# Patient Record
Sex: FEMALE | Race: WHITE | NOT HISPANIC OR LATINO
[De-identification: names, ages, dates, MRNs, and addresses within clinical notes are randomized per-mention and may not be internally consistent; named-entity substitution may affect disease eponyms.]

---

## 2017-09-11 ENCOUNTER — RX RENEWAL (OUTPATIENT)
Age: 57
End: 2017-09-11

## 2018-05-24 ENCOUNTER — EMERGENCY (EMERGENCY)
Facility: HOSPITAL | Age: 58
LOS: 1 days | Discharge: HOME | End: 2018-05-24
Attending: EMERGENCY MEDICINE
Payer: COMMERCIAL

## 2018-05-24 VITALS
RESPIRATION RATE: 17 BRPM | OXYGEN SATURATION: 99 % | SYSTOLIC BLOOD PRESSURE: 181 MMHG | DIASTOLIC BLOOD PRESSURE: 94 MMHG | HEART RATE: 84 BPM

## 2018-05-24 VITALS
OXYGEN SATURATION: 100 % | SYSTOLIC BLOOD PRESSURE: 166 MMHG | DIASTOLIC BLOOD PRESSURE: 91 MMHG | TEMPERATURE: 100 F | RESPIRATION RATE: 17 BRPM | HEART RATE: 69 BPM

## 2018-05-24 DIAGNOSIS — Z98.890 OTHER SPECIFIED POSTPROCEDURAL STATES: Chronic | ICD-10-CM

## 2018-05-24 LAB
ALBUMIN SERPL ELPH-MCNC: 4.8 G/DL — SIGNIFICANT CHANGE UP (ref 3.3–5)
ALP SERPL-CCNC: 74 U/L — SIGNIFICANT CHANGE UP (ref 40–120)
ALT FLD-CCNC: 21 U/L — SIGNIFICANT CHANGE UP (ref 10–45)
ANION GAP SERPL CALC-SCNC: 13 MMOL/L — SIGNIFICANT CHANGE UP (ref 5–17)
APTT BLD: 32.4 SEC — SIGNIFICANT CHANGE UP (ref 27.5–37.4)
AST SERPL-CCNC: 27 U/L — SIGNIFICANT CHANGE UP (ref 10–40)
BASOPHILS # BLD AUTO: 0 K/UL — SIGNIFICANT CHANGE UP (ref 0–0.2)
BASOPHILS NFR BLD AUTO: 0.4 % — SIGNIFICANT CHANGE UP (ref 0–2)
BILIRUB SERPL-MCNC: 0.4 MG/DL — SIGNIFICANT CHANGE UP (ref 0.2–1.2)
BUN SERPL-MCNC: 17 MG/DL — SIGNIFICANT CHANGE UP (ref 7–23)
CALCIUM SERPL-MCNC: 10.3 MG/DL — SIGNIFICANT CHANGE UP (ref 8.4–10.5)
CHLORIDE SERPL-SCNC: 100 MMOL/L — SIGNIFICANT CHANGE UP (ref 96–108)
CO2 SERPL-SCNC: 27 MMOL/L — SIGNIFICANT CHANGE UP (ref 22–31)
CREAT SERPL-MCNC: 0.99 MG/DL — SIGNIFICANT CHANGE UP (ref 0.5–1.3)
EOSINOPHIL # BLD AUTO: 0 K/UL — SIGNIFICANT CHANGE UP (ref 0–0.5)
EOSINOPHIL NFR BLD AUTO: 0.3 % — SIGNIFICANT CHANGE UP (ref 0–6)
GLUCOSE SERPL-MCNC: 113 MG/DL — HIGH (ref 70–99)
HCT VFR BLD CALC: 38.9 % — SIGNIFICANT CHANGE UP (ref 34.5–45)
HGB BLD-MCNC: 12.8 G/DL — SIGNIFICANT CHANGE UP (ref 11.5–15.5)
INR BLD: 1.06 RATIO — SIGNIFICANT CHANGE UP (ref 0.88–1.16)
LYMPHOCYTES # BLD AUTO: 1.2 K/UL — SIGNIFICANT CHANGE UP (ref 1–3.3)
LYMPHOCYTES # BLD AUTO: 19.1 % — SIGNIFICANT CHANGE UP (ref 13–44)
MCHC RBC-ENTMCNC: 30 PG — SIGNIFICANT CHANGE UP (ref 27–34)
MCHC RBC-ENTMCNC: 32.9 GM/DL — SIGNIFICANT CHANGE UP (ref 32–36)
MCV RBC AUTO: 91.2 FL — SIGNIFICANT CHANGE UP (ref 80–100)
MONOCYTES # BLD AUTO: 0.3 K/UL — SIGNIFICANT CHANGE UP (ref 0–0.9)
MONOCYTES NFR BLD AUTO: 5.3 % — SIGNIFICANT CHANGE UP (ref 2–14)
NEUTROPHILS # BLD AUTO: 4.6 K/UL — SIGNIFICANT CHANGE UP (ref 1.8–7.4)
NEUTROPHILS NFR BLD AUTO: 74.8 % — SIGNIFICANT CHANGE UP (ref 43–77)
PLATELET # BLD AUTO: 274 K/UL — SIGNIFICANT CHANGE UP (ref 150–400)
POTASSIUM SERPL-MCNC: 4.1 MMOL/L — SIGNIFICANT CHANGE UP (ref 3.5–5.3)
POTASSIUM SERPL-SCNC: 4.1 MMOL/L — SIGNIFICANT CHANGE UP (ref 3.5–5.3)
PROT SERPL-MCNC: 7.5 G/DL — SIGNIFICANT CHANGE UP (ref 6–8.3)
PROTHROM AB SERPL-ACNC: 11.6 SEC — SIGNIFICANT CHANGE UP (ref 9.8–12.7)
RBC # BLD: 4.26 M/UL — SIGNIFICANT CHANGE UP (ref 3.8–5.2)
RBC # FLD: 12.1 % — SIGNIFICANT CHANGE UP (ref 10.3–14.5)
SODIUM SERPL-SCNC: 140 MMOL/L — SIGNIFICANT CHANGE UP (ref 135–145)
WBC # BLD: 6.1 K/UL — SIGNIFICANT CHANGE UP (ref 3.8–10.5)
WBC # FLD AUTO: 6.1 K/UL — SIGNIFICANT CHANGE UP (ref 3.8–10.5)

## 2018-05-24 PROCEDURE — 85610 PROTHROMBIN TIME: CPT

## 2018-05-24 PROCEDURE — 73562 X-RAY EXAM OF KNEE 3: CPT | Mod: 26,LT

## 2018-05-24 PROCEDURE — 96374 THER/PROPH/DIAG INJ IV PUSH: CPT

## 2018-05-24 PROCEDURE — 85027 COMPLETE CBC AUTOMATED: CPT

## 2018-05-24 PROCEDURE — 99284 EMERGENCY DEPT VISIT MOD MDM: CPT

## 2018-05-24 PROCEDURE — 96375 TX/PRO/DX INJ NEW DRUG ADDON: CPT

## 2018-05-24 PROCEDURE — 85730 THROMBOPLASTIN TIME PARTIAL: CPT

## 2018-05-24 PROCEDURE — 99284 EMERGENCY DEPT VISIT MOD MDM: CPT | Mod: 25

## 2018-05-24 PROCEDURE — 80053 COMPREHEN METABOLIC PANEL: CPT

## 2018-05-24 PROCEDURE — 73562 X-RAY EXAM OF KNEE 3: CPT

## 2018-05-24 PROCEDURE — 80061 LIPID PANEL: CPT

## 2018-05-24 RX ORDER — ACETAMINOPHEN WITH CODEINE 300MG-30MG
1 TABLET ORAL
Qty: 10 | Refills: 0
Start: 2018-05-24

## 2018-05-24 RX ORDER — ACETAMINOPHEN 500 MG
1000 TABLET ORAL ONCE
Qty: 0 | Refills: 0 | Status: COMPLETED | OUTPATIENT
Start: 2018-05-24 | End: 2018-05-24

## 2018-05-24 RX ORDER — KETOROLAC TROMETHAMINE 30 MG/ML
15 SYRINGE (ML) INJECTION ONCE
Qty: 0 | Refills: 0 | Status: DISCONTINUED | OUTPATIENT
Start: 2018-05-24 | End: 2018-05-24

## 2018-05-24 RX ADMIN — Medication 400 MILLIGRAM(S): at 18:40

## 2018-05-24 RX ADMIN — Medication 15 MILLIGRAM(S): at 21:42

## 2018-05-24 NOTE — ED PROVIDER NOTE - MEDICAL DECISION MAKING DETAILS
56 y/o F pt with PMHx of osteoporosis, CAD s/p stent, breast ca presents to the ED for left knee pain s/p mechanical slip and fall. On exam, defect to left patella + DP pulses NVI, limited ROM of left knee secondary to pain. Concern for fx. Will place IV and administer pain control, XR ortho consult and reassess.

## 2018-05-24 NOTE — ED ADULT NURSE NOTE - ADDITIONAL PRINTED INSTRUCTIONS GIVEN
escorted to waiting room via wheelchair,  present, giovani axillary crutches in pt possession, denies c/o escorted to waiting room via wheelchair,  present, giovani axillary crutches in pt possession, denies c/o, **declines adm for surgery

## 2018-05-24 NOTE — ED ADULT NURSE REASSESSMENT NOTE - NS ED NURSE REASSESS COMMENT FT1
pt assisted to bathroom via wheelchair, able to amb short distances with giovani axillary crutches, per ortho, WBAT LLE, ice pack applied, splint applied by ortho pt assisted to bathroom via wheelchair, able to amb short distances with giovani axillary crutches, per ortho, WBAT LLE, ice pack applied, splint applied by ortho, pt declines surgery

## 2018-05-24 NOTE — ED ADULT NURSE NOTE - OBJECTIVE STATEMENT
57 yr old female by EMS with  s/p L knee patellar partial dislocation, self reduced patella, now with persistent patellar pain with deformity, knee maintained in flexion, L knee braced to R knee, pain free when immobile, at present +L pedal pulses/caprefill/sensation intact, +wiggles toes, L knee redness/deformity noted, ice pack applied, medicated with ofirmev, RLE : wnl,  present, no other c/o, awaiting xray

## 2018-05-24 NOTE — ED PROVIDER NOTE - PROGRESS NOTE DETAILS
paged ortho XR findings consulted with ortho, pending consult. attending Alexa: pt splinted by zachary. Ambulated with crutches in ED without difficulty. Lengthy discussion regarding results and need to follow-up with ortho for reevaluation and MRI knee discussed. Will dc with pain control, close ortho follow-up and strict return precautions.

## 2018-05-25 LAB
CHOLEST SERPL-MCNC: 137 MG/DL — SIGNIFICANT CHANGE UP (ref 10–199)
HDLC SERPL-MCNC: 80 MG/DL — SIGNIFICANT CHANGE UP (ref 40–125)
LIPID PNL WITH DIRECT LDL SERPL: 48 MG/DL — SIGNIFICANT CHANGE UP
TOTAL CHOLESTEROL/HDL RATIO MEASUREMENT: 1.7 RATIO — LOW (ref 3.3–7.1)
TRIGL SERPL-MCNC: 44 MG/DL — SIGNIFICANT CHANGE UP (ref 10–149)

## 2018-05-25 RX ORDER — OXYCODONE AND ACETAMINOPHEN 5; 325 MG/1; MG/1
2 TABLET ORAL
Qty: 40 | Refills: 0
Start: 2018-05-25 | End: 2018-05-29

## 2018-05-25 NOTE — CONSULT NOTE ADULT - SUBJECTIVE AND OBJECTIVE BOX
57y Female presents c/o L knee pain sp mechanical fall. Denies HS/LOC. Denies numbness/tingling. Denies fevers/chills. No other orthopedic complaints at time time. Patient on plavix    HEALTH ISSUES - PROBLEM Dx:        PAST MEDICAL & SURGICAL HISTORY:  Osteoporosis  CAD (Coronary Artery Disease)  HTN (Hypertension)  Prinzmetal Variant Angina  History of lumpectomy of left breast  Coronary Stent: LAD x1    MEDICATIONS  (STANDING):    Allergies    No Known Allergies    Intolerances                                12.8   6.1   )-----------( 274      ( 24 May 2018 18:57 )             38.9     24 May 2018 18:57    140    |  100    |  17     ----------------------------<  113    4.1     |  27     |  0.99     Ca    10.3       24 May 2018 18:57    TPro  7.5    /  Alb  4.8    /  TBili  0.4    /  DBili  x      /  AST  27     /  ALT  21     /  AlkPhos  74     24 May 2018 18:57      Vital Signs Last 24 Hrs  T(C): 37.6 (05-24-18 @ 22:00), Max: 37.9 (05-24-18 @ 19:20)  T(F): 99.6 (05-24-18 @ 22:00), Max: 100.2 (05-24-18 @ 19:20)  HR: 69 (05-24-18 @ 22:00) (69 - 84)  BP: 166/91 (05-24-18 @ 22:00) (159/80 - 181/94)  BP(mean): --  RR: 17 (05-24-18 @ 22:00) (17 - 19)  SpO2: 100% (05-24-18 @ 22:00) (99% - 100%)    Gen: NAD  L LE: skin intact, +ttp patella, +palpable defect, +knee effusion, unable to SLR, extensor mechanism disrupted, no bony ttp elsewhere, negative log roll, no calf ttp, +ehl/fhl/ta/gs function, l2-s1 silt, dp pt pulse intact, warm/well perfused, palpable defect at patella, defect of quad tendon  Secondary survey: benign, no bony ttp elsewhere, nv intact    Imaging: XR demonstrates L patella fracture    A/P: 57y Female w L patella fracture  Pain control  WBAT L LE in [facility], keep c/d/i, assistive devices as needed  No knee flexion  ice/elevation  No acute ortho surgical intervention   Possible need for surgical intervention in future/outpatient discussed  Follow up with Dr. Caicedo as outpatient in 3-5 days, call office for appointment  Ortho stable

## 2018-05-31 ENCOUNTER — APPOINTMENT (OUTPATIENT)
Dept: ORTHOPEDIC SURGERY | Facility: CLINIC | Age: 58
End: 2018-05-31
Payer: COMMERCIAL

## 2018-05-31 DIAGNOSIS — Z87.39 PERSONAL HISTORY OF OTHER DISEASES OF THE MUSCULOSKELETAL SYSTEM AND CONNECTIVE TISSUE: ICD-10-CM

## 2018-05-31 DIAGNOSIS — Z78.9 OTHER SPECIFIED HEALTH STATUS: ICD-10-CM

## 2018-05-31 DIAGNOSIS — S82.042A DISPLACED COMMINUTED FRACTURE OF LEFT PATELLA, INITIAL ENCOUNTER FOR CLOSED FRACTURE: ICD-10-CM

## 2018-05-31 DIAGNOSIS — Z85.3 PERSONAL HISTORY OF MALIGNANT NEOPLASM OF BREAST: ICD-10-CM

## 2018-05-31 PROCEDURE — 99203 OFFICE O/P NEW LOW 30 MIN: CPT

## 2018-05-31 RX ORDER — HYDROCODONE BITARTRATE AND ACETAMINOPHEN 5; 325 MG/1; MG/1
5-325 TABLET ORAL
Qty: 90 | Refills: 0 | Status: ACTIVE | COMMUNITY
Start: 2018-05-31 | End: 1900-01-01

## 2018-05-31 RX ORDER — OXYCODONE AND ACETAMINOPHEN 5; 325 MG/1; MG/1
5-325 TABLET ORAL
Qty: 40 | Refills: 0 | Status: ACTIVE | COMMUNITY
Start: 2018-05-25

## 2018-06-01 ENCOUNTER — APPOINTMENT (OUTPATIENT)
Dept: CARDIOLOGY | Facility: CLINIC | Age: 58
End: 2018-06-01
Payer: COMMERCIAL

## 2018-06-01 ENCOUNTER — NON-APPOINTMENT (OUTPATIENT)
Age: 58
End: 2018-06-01

## 2018-06-01 VITALS
HEART RATE: 115 BPM | BODY MASS INDEX: 25.69 KG/M2 | DIASTOLIC BLOOD PRESSURE: 66 MMHG | SYSTOLIC BLOOD PRESSURE: 102 MMHG | OXYGEN SATURATION: 97 % | WEIGHT: 145 LBS

## 2018-06-01 DIAGNOSIS — Z01.810 ENCOUNTER FOR PREPROCEDURAL CARDIOVASCULAR EXAMINATION: ICD-10-CM

## 2018-06-01 PROCEDURE — 93000 ELECTROCARDIOGRAM COMPLETE: CPT

## 2018-06-01 PROCEDURE — 99204 OFFICE O/P NEW MOD 45 MIN: CPT

## 2018-06-04 RX ORDER — OXYCODONE AND ACETAMINOPHEN 5; 325 MG/1; MG/1
5-325 TABLET ORAL
Qty: 28 | Refills: 0 | Status: ACTIVE | COMMUNITY
Start: 2018-06-04 | End: 1900-01-01

## 2018-07-09 PROBLEM — S82.042A CLOSED DISPLACED COMMINUTED FRACTURE OF LEFT PATELLA, INITIAL ENCOUNTER: Status: ACTIVE | Noted: 2018-05-31

## 2018-09-06 ENCOUNTER — RX RENEWAL (OUTPATIENT)
Age: 58
End: 2018-09-06

## 2018-12-11 NOTE — ED PROVIDER NOTE - OBJECTIVE STATEMENT
58 y/o F pt with PMHx of coronary artery spasm, osteoporosis, breast ca, PSHx of lumpectomy (left breast ca) c/o left knee pain s/p trip and fall. She slipped and fell in her kitchen and onto her left knee. States that she saw her patella pointing upwards and she pushed it back in and felt a click. She placed ice on the knee with relief. Pt was on the floor for 20 minutes before getting picked up by the EMS. Pt is a pediatrician. Denies LOC, head injury or any other complaints. NKDA. Current medications: isosorbide, losartan, aspirin Statement Selected

## 2019-09-03 ENCOUNTER — RX RENEWAL (OUTPATIENT)
Age: 59
End: 2019-09-03

## 2019-11-20 PROBLEM — M81.0 AGE-RELATED OSTEOPOROSIS WITHOUT CURRENT PATHOLOGICAL FRACTURE: Chronic | Status: ACTIVE | Noted: 2018-05-24

## 2020-02-05 ENCOUNTER — APPOINTMENT (OUTPATIENT)
Dept: GASTROENTEROLOGY | Facility: CLINIC | Age: 60
End: 2020-02-05
Payer: COMMERCIAL

## 2020-02-05 VITALS
BODY MASS INDEX: 27.6 KG/M2 | SYSTOLIC BLOOD PRESSURE: 124 MMHG | HEIGHT: 62 IN | WEIGHT: 150 LBS | DIASTOLIC BLOOD PRESSURE: 82 MMHG | OXYGEN SATURATION: 97 % | HEART RATE: 76 BPM | RESPIRATION RATE: 15 BRPM

## 2020-02-05 DIAGNOSIS — R10.13 EPIGASTRIC PAIN: ICD-10-CM

## 2020-02-05 PROCEDURE — 99205 OFFICE O/P NEW HI 60 MIN: CPT

## 2020-02-05 RX ORDER — PANTOPRAZOLE 40 MG/1
40 TABLET, DELAYED RELEASE ORAL
Qty: 90 | Refills: 1 | Status: ACTIVE | COMMUNITY
Start: 2020-02-05 | End: 1900-01-01

## 2020-02-05 RX ORDER — POLYETHYLENE GLYCOL 3350, SODIUM SULFATE, SODIUM CHLORIDE, POTASSIUM CHLORIDE, ASCORBIC ACID, SODIUM ASCORBATE 7.5-2.691G
100 KIT ORAL
Qty: 1 | Refills: 0 | Status: ACTIVE | COMMUNITY
Start: 2020-02-05 | End: 1900-01-01

## 2020-02-05 RX ORDER — SODIUM PICOSULFATE, MAGNESIUM OXIDE, AND ANHYDROUS CITRIC ACID 10; 3.5; 12 MG/160ML; G/160ML; G/160ML
10-3.5-12 MG-GM LIQUID ORAL
Qty: 320 | Refills: 0 | Status: ACTIVE | COMMUNITY
Start: 2020-02-05 | End: 1900-01-01

## 2020-02-05 NOTE — ASSESSMENT
[FreeTextEntry1] : Impression:\par \par #1 colon cancer screening-rule out colonic neoplasm. Family history of colon cancer affecting first degree relative (mother age 62) and a second degree relative (maternal grandfather age 50) is noted and poses an increased risk of colorectal neoplasia. No current lower GI symptoms reported.\par \par #2 GERD-chronic occasional symptoms of heartburn described as burning in her throat with occasional heartburn and regurgitation and also with associated ENT symptoms of throat clearing, cough and throat irritation. Increased severity of symptoms reported over past 2 months but seems better this past month. Also on alendronate but not clear to what degree this is contributing to current symptoms. No indication of pill-induced esophageal ulcer.\par \par #3 dyspepsia-associated intermittent symptoms of epigastric and left upper quadrant achy and gnawing discomfort. Possibly GERD associated. Doubt PUD but will evaluate. As noted, symptoms have been long-standing over past 10 years and therefore neoplastic process less likely. Does experience some associated left anterior chest discomfort and patient advised of the need for cardiac followup.\par \par #4 CAD-status post LAD stent 4/2011. 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

## 2020-02-05 NOTE — HISTORY OF PRESENT ILLNESS
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 Patient also reports dyspepsia and GERD symptoms. History per patient. Patient has no PMD.  was in attendance who is an anesthesiologist.\par \par Significant cardiac history is reported with diagnosis of Prinzmetal angina (coronary artery spasm) and also occlusive CAD. In 4/2011 the patient had an LAD stent for high-grade LAD stenosis. Does not report having had an MI. No history of arrhythmias or other cardiac history. Patient reports last evaluation by cardiology was in 2018.\par \par Patient is on long standing maintenance aspirin 81 mg and clopidogrel 75 mg daily.\par \par Family history of colon cancer is reported with mother diagnosed with a mucinous adenocarcinoma of the sigmoid colon at age 62. Mother also had a papillary renal cell carcinoma. In addition, maternal grandfather also had colon cancer at approximately age 50.\par \par The patient reports undergoing 3 prior colonoscopy examinations in 1999, 12/2010 and 6/2015. Patient reports that all exams were normal except for diminutive 3 mm hyperplastic polyp at the 2015 colonoscopy.\par \par Patient has long-standing symptoms of indigestion, reflux and dyspepsia for the past 10 years since cardiac diagnosis. However, increased frequency of and severity of symptoms were reported throughout 12/2019.\par \par GI symptoms described as heartburn manifesting as feelings of regurgitation and burning sensation in her throat. As noted, this has been intermittent for the past several years but increased in frequency over the past 2 months and patient was having several episodes a day during 12/2019. Associated symptoms of urge to belch as well as feeling of throat irritation with need to clear her throat is also reported.\par \par In addition, the patient experiences intermittent epigastric and left upper quadrant discomfort. Described as a achy and gnawing feeling. Can be provoked by acid foods. Describes this as a gnawing and grinding discomfort. The symptoms could also be associated with an achy left anterior chest discomfort. This is not exertional. This has been intermittent and long-standing.\par \par Her appetite is fair. Denies weight change. Denies any complaints of dysphagia. Swallows liquids without any choking, coughing or nasal regurgitation. Swallow solid food without any retrosternal bolus hangup.\par \par No complaints of lower abdominal pain.\par \par The patient typically has one formed bowel movement daily. Can have occasional diarrhea but appears infrequent. Mostly daily normal bowel movements. No reported constipation, change in bowel habit or rectal bleeding.\par \par Reports history of hepatic hemangioma. Reports no history of digestive illness.\par \par Patient has a history of osteoporosis. Currently on alendronate 70 mg weekly. Not clear as to what degree there is any relation between this medication and her upper GI symptoms. Patient indicates she is cautious as to medication administration and always takes meds with adequate fluid in upright position.

## 2020-02-05 NOTE — REASON FOR VISIT
[Consultation] : a consultation visit [Spouse] : spouse [FreeTextEntry1] : in preparation for a screening colonoscopy and for evaluation of GERD and dyspepsia.

## 2020-02-05 NOTE — PHYSICAL EXAM
[General Appearance - Alert] : alert [General Appearance - In No Acute Distress] : in no acute distress [General Appearance - Well Nourished] : well nourished [General Appearance - Well Developed] : well developed [General Appearance - Well-Appearing] : healthy appearing [Sclera] : the sclera and conjunctiva were normal [Oropharynx] : the oropharynx was normal [Neck Appearance] : the appearance of the neck was normal [Neck Cervical Mass (___cm)] : no neck mass was observed [Respiration, Rhythm And Depth] : normal respiratory rhythm and effort [Exaggerated Use Of Accessory Muscles For Inspiration] : no accessory muscle use [Auscultation Breath Sounds / Voice Sounds] : lungs were clear to auscultation bilaterally [Heart Rate And Rhythm] : heart rate was normal and rhythm regular [Heart Sounds] : normal S1 and S2 [Heart Sounds Gallop] : no gallops [Murmurs] : no murmurs [Heart Sounds Pericardial Friction Rub] : no pericardial rub [Edema] : there was no peripheral edema [Bowel Sounds] : normal bowel sounds [Abdomen Soft] : soft [Abdomen Tenderness] : non-tender [] : no hepato-splenomegaly [Abdomen Mass (___ Cm)] : no abdominal mass palpated [Abdomen Hernia] : no hernia was discovered [Cervical Lymph Nodes Enlarged Posterior Bilaterally] : posterior cervical [Cervical Lymph Nodes Enlarged Anterior Bilaterally] : anterior cervical [Supraclavicular Lymph Nodes Enlarged Bilaterally] : supraclavicular [No CVA Tenderness] : no ~M costovertebral angle tenderness [Abnormal Walk] : normal gait [Nail Clubbing] : no clubbing  or cyanosis of the fingernails [Skin Color & Pigmentation] : normal skin color and pigmentation [Oriented To Time, Place, And Person] : oriented to person, place, and time [Impaired Insight] : insight and judgment were intact [Affect] : the affect was normal [Mood] : the mood was normal

## 2020-02-06 LAB
25(OH)D3 SERPL-MCNC: 76.9 NG/ML
ALBUMIN SERPL ELPH-MCNC: 5 G/DL
ALP BLD-CCNC: 75 U/L
ALT SERPL-CCNC: 19 U/L
ANION GAP SERPL CALC-SCNC: 14 MMOL/L
AST SERPL-CCNC: 24 U/L
BASOPHILS # BLD AUTO: 0.04 K/UL
BASOPHILS NFR BLD AUTO: 0.7 %
BILIRUB SERPL-MCNC: 0.2 MG/DL
BUN SERPL-MCNC: 16 MG/DL
CALCIUM SERPL-MCNC: 10.5 MG/DL
CHLORIDE SERPL-SCNC: 103 MMOL/L
CHOLEST SERPL-MCNC: 137 MG/DL
CHOLEST/HDLC SERPL: 1.7 RATIO
CO2 SERPL-SCNC: 24 MMOL/L
CREAT SERPL-MCNC: 1.01 MG/DL
EOSINOPHIL # BLD AUTO: 0.08 K/UL
EOSINOPHIL NFR BLD AUTO: 1.3 %
ESTIMATED AVERAGE GLUCOSE: 123 MG/DL
GLUCOSE SERPL-MCNC: 102 MG/DL
HBA1C MFR BLD HPLC: 5.9 %
HCT VFR BLD CALC: 41.6 %
HDLC SERPL-MCNC: 80 MG/DL
HGB BLD-MCNC: 12.7 G/DL
IMM GRANULOCYTES NFR BLD AUTO: 0.3 %
LDLC SERPL CALC-MCNC: 49 MG/DL
LYMPHOCYTES # BLD AUTO: 1.08 K/UL
LYMPHOCYTES NFR BLD AUTO: 18.1 %
MAN DIFF?: NORMAL
MCHC RBC-ENTMCNC: 28.2 PG
MCHC RBC-ENTMCNC: 30.5 GM/DL
MCV RBC AUTO: 92.4 FL
MONOCYTES # BLD AUTO: 0.4 K/UL
MONOCYTES NFR BLD AUTO: 6.7 %
NEUTROPHILS # BLD AUTO: 4.34 K/UL
NEUTROPHILS NFR BLD AUTO: 72.9 %
PLATELET # BLD AUTO: 307 K/UL
POTASSIUM SERPL-SCNC: 5.2 MMOL/L
PROT SERPL-MCNC: 7.4 G/DL
RBC # BLD: 4.5 M/UL
RBC # FLD: 13.8 %
SODIUM SERPL-SCNC: 142 MMOL/L
TRIGL SERPL-MCNC: 41 MG/DL
WBC # FLD AUTO: 5.96 K/UL

## 2020-06-19 ENCOUNTER — APPOINTMENT (OUTPATIENT)
Dept: GASTROENTEROLOGY | Facility: HOSPITAL | Age: 60
End: 2020-06-19

## 2020-10-06 ENCOUNTER — TRANSCRIPTION ENCOUNTER (OUTPATIENT)
Age: 60
End: 2020-10-06

## 2020-10-10 ENCOUNTER — TRANSCRIPTION ENCOUNTER (OUTPATIENT)
Age: 60
End: 2020-10-10

## 2020-10-12 ENCOUNTER — TRANSCRIPTION ENCOUNTER (OUTPATIENT)
Age: 60
End: 2020-10-12

## 2020-12-05 ENCOUNTER — NON-APPOINTMENT (OUTPATIENT)
Age: 60
End: 2020-12-05

## 2021-05-18 ENCOUNTER — TRANSCRIPTION ENCOUNTER (OUTPATIENT)
Age: 61
End: 2021-05-18

## 2021-05-20 ENCOUNTER — APPOINTMENT (OUTPATIENT)
Dept: MAMMOGRAPHY | Facility: CLINIC | Age: 61
End: 2021-05-20
Payer: COMMERCIAL

## 2021-05-20 ENCOUNTER — APPOINTMENT (OUTPATIENT)
Dept: ULTRASOUND IMAGING | Facility: CLINIC | Age: 61
End: 2021-05-20
Payer: COMMERCIAL

## 2021-05-20 ENCOUNTER — OUTPATIENT (OUTPATIENT)
Dept: OUTPATIENT SERVICES | Facility: HOSPITAL | Age: 61
LOS: 1 days | End: 2021-05-20
Payer: COMMERCIAL

## 2021-05-20 DIAGNOSIS — Z98.890 OTHER SPECIFIED POSTPROCEDURAL STATES: Chronic | ICD-10-CM

## 2021-05-20 DIAGNOSIS — Z00.8 ENCOUNTER FOR OTHER GENERAL EXAMINATION: ICD-10-CM

## 2021-05-20 PROCEDURE — 77063 BREAST TOMOSYNTHESIS BI: CPT | Mod: 26

## 2021-05-20 PROCEDURE — 76641 ULTRASOUND BREAST COMPLETE: CPT

## 2021-05-20 PROCEDURE — 77067 SCR MAMMO BI INCL CAD: CPT | Mod: 26

## 2021-05-20 PROCEDURE — 76641 ULTRASOUND BREAST COMPLETE: CPT | Mod: 26,50

## 2021-05-20 PROCEDURE — 77067 SCR MAMMO BI INCL CAD: CPT

## 2021-05-20 PROCEDURE — 77063 BREAST TOMOSYNTHESIS BI: CPT

## 2021-10-06 ENCOUNTER — NON-APPOINTMENT (OUTPATIENT)
Age: 61
End: 2021-10-06

## 2023-07-11 ENCOUNTER — NON-APPOINTMENT (OUTPATIENT)
Age: 63
End: 2023-07-11

## 2023-07-12 ENCOUNTER — APPOINTMENT (OUTPATIENT)
Dept: CARDIOLOGY | Facility: CLINIC | Age: 63
End: 2023-07-12
Payer: MEDICARE

## 2023-07-12 ENCOUNTER — NON-APPOINTMENT (OUTPATIENT)
Age: 63
End: 2023-07-12

## 2023-07-12 VITALS
OXYGEN SATURATION: 97 % | DIASTOLIC BLOOD PRESSURE: 72 MMHG | WEIGHT: 140 LBS | BODY MASS INDEX: 25.76 KG/M2 | HEIGHT: 62 IN | SYSTOLIC BLOOD PRESSURE: 112 MMHG | HEART RATE: 55 BPM

## 2023-07-12 DIAGNOSIS — H53.8 OTHER VISUAL DISTURBANCES: ICD-10-CM

## 2023-07-12 PROCEDURE — 93000 ELECTROCARDIOGRAM COMPLETE: CPT

## 2023-07-12 PROCEDURE — 99204 OFFICE O/P NEW MOD 45 MIN: CPT

## 2023-07-12 RX ORDER — AMLODIPINE BESYLATE 5 MG/1
5 TABLET ORAL DAILY
Qty: 30 | Refills: 9 | Status: ACTIVE | COMMUNITY
Start: 2023-07-12 | End: 1900-01-01

## 2023-07-12 RX ORDER — METOPROLOL SUCCINATE 25 MG/1
25 TABLET, EXTENDED RELEASE ORAL DAILY
Qty: 30 | Refills: 9 | Status: ACTIVE | COMMUNITY
Start: 2023-07-12

## 2023-07-12 NOTE — HISTORY OF PRESENT ILLNESS
[FreeTextEntry1] : 63-year-old female with a history of coronary artery disease associated with hypertension and hypercholesterolemia.  She had a stent placed within occlusion and associated coronary spasm in 2011.  She was last seen here in 2018.  She reports that she continues to get episodes of pain several times per week.  The episodes are not severe and usually occur at rest.  She is very physically active doing about 2 hours of exercise per day and has no difficulty during exercise.\par \par She is also experiencing some blurring of vision in her right eye and thinks she needs to have it worked up.  She has some change with position.\par \par During her past few years she has been seen by other doctors and her medications have changed.  She remains on dual antiplatelet therapy with aspirin and Plavix.  She is now off losartan in his right opinion and is on metoprolol and amlodipine.\par \par Her last LDL cholesterol was 56 on Lipitor 40.

## 2023-07-12 NOTE — REVIEW OF SYSTEMS
[Blurry Vision] : blurred vision [Chest Discomfort] : chest discomfort [Joint Pain] : joint pain [Negative] : Heme/Lymph

## 2023-07-12 NOTE — DISCUSSION/SUMMARY
[FreeTextEntry1] : Ms Lemons continues to experience frequent episodes of chest pain. .  Her exam is unremarkable with normal blood pressure, clear lungs, and a normal cardiac exam.  Her EKG remains within normal limits.\par \par Her chest pain is peculiar.  Its been present for decades and she continues to experience frequent episodes of pain.  However, despite that she is able to exercise several hours a day without difficulty.\par \par She needs neurologic work-up for her blurry right vision.  She was given the names of several neurologist.\par \par After discussion we decided to continue her on dual antiplatelet therapy with aspirin and Plavix.  She will also continue with metoprolol, amlodipine, and rosuvastatin.  She will follow-up in 6 months. [EKG obtained to assist in diagnosis and management of assessed problem(s)] : EKG obtained to assist in diagnosis and management of assessed problem(s)

## 2023-10-12 ENCOUNTER — NON-APPOINTMENT (OUTPATIENT)
Age: 63
End: 2023-10-12

## 2023-10-12 ENCOUNTER — APPOINTMENT (OUTPATIENT)
Dept: CARDIOLOGY | Facility: CLINIC | Age: 63
End: 2023-10-12
Payer: MEDICARE

## 2023-10-12 VITALS
BODY MASS INDEX: 25.03 KG/M2 | OXYGEN SATURATION: 95 % | DIASTOLIC BLOOD PRESSURE: 80 MMHG | WEIGHT: 136 LBS | RESPIRATION RATE: 17 BRPM | HEIGHT: 62 IN | SYSTOLIC BLOOD PRESSURE: 117 MMHG | HEART RATE: 58 BPM

## 2023-10-12 DIAGNOSIS — E78.00 PURE HYPERCHOLESTEROLEMIA, UNSPECIFIED: ICD-10-CM

## 2023-10-12 DIAGNOSIS — I25.119 ATHEROSCLEROTIC HEART DISEASE OF NATIVE CORONARY ARTERY WITH UNSPECIFIED ANGINA PECTORIS: ICD-10-CM

## 2023-10-12 DIAGNOSIS — I10 ESSENTIAL (PRIMARY) HYPERTENSION: ICD-10-CM

## 2023-10-12 DIAGNOSIS — R07.9 CHEST PAIN, UNSPECIFIED: ICD-10-CM

## 2023-10-12 PROCEDURE — 93000 ELECTROCARDIOGRAM COMPLETE: CPT

## 2023-10-12 PROCEDURE — 99214 OFFICE O/P EST MOD 30 MIN: CPT

## 2023-10-19 ENCOUNTER — TRANSCRIPTION ENCOUNTER (OUTPATIENT)
Age: 63
End: 2023-10-19

## 2023-10-23 ENCOUNTER — OUTPATIENT (OUTPATIENT)
Dept: OUTPATIENT SERVICES | Facility: HOSPITAL | Age: 63
LOS: 1 days | End: 2023-10-23
Payer: MEDICARE

## 2023-10-23 ENCOUNTER — APPOINTMENT (OUTPATIENT)
Dept: CV DIAGNOSTICS | Facility: HOSPITAL | Age: 63
End: 2023-10-23

## 2023-10-23 DIAGNOSIS — Z98.890 OTHER SPECIFIED POSTPROCEDURAL STATES: Chronic | ICD-10-CM

## 2023-10-23 DIAGNOSIS — R07.9 CHEST PAIN, UNSPECIFIED: ICD-10-CM

## 2023-10-23 PROCEDURE — 93016 CV STRESS TEST SUPVJ ONLY: CPT | Mod: MH

## 2023-10-23 PROCEDURE — 78452 HT MUSCLE IMAGE SPECT MULT: CPT | Mod: 26,MH

## 2023-10-23 PROCEDURE — A9500: CPT

## 2023-10-23 PROCEDURE — 78452 HT MUSCLE IMAGE SPECT MULT: CPT | Mod: MH

## 2023-10-23 PROCEDURE — 93017 CV STRESS TEST TRACING ONLY: CPT

## 2023-10-23 PROCEDURE — 93018 CV STRESS TEST I&R ONLY: CPT | Mod: MH

## 2023-10-30 ENCOUNTER — TRANSCRIPTION ENCOUNTER (OUTPATIENT)
Age: 63
End: 2023-10-30

## 2023-11-03 ENCOUNTER — LABORATORY RESULT (OUTPATIENT)
Age: 63
End: 2023-11-03

## 2023-11-03 ENCOUNTER — NON-APPOINTMENT (OUTPATIENT)
Age: 63
End: 2023-11-03

## 2023-11-03 ENCOUNTER — APPOINTMENT (OUTPATIENT)
Dept: NEUROLOGY | Facility: CLINIC | Age: 63
End: 2023-11-03
Payer: MEDICARE

## 2023-11-03 VITALS — SYSTOLIC BLOOD PRESSURE: 149 MMHG | HEART RATE: 64 BPM | DIASTOLIC BLOOD PRESSURE: 75 MMHG

## 2023-11-03 DIAGNOSIS — H53.9 UNSPECIFIED VISUAL DISTURBANCE: ICD-10-CM

## 2023-11-03 LAB
ALBUMIN SERPL ELPH-MCNC: 4.6 G/DL
ALP BLD-CCNC: 80 U/L
ALT SERPL-CCNC: 24 U/L
ANION GAP SERPL CALC-SCNC: 12 MMOL/L
APTT BLD: 34.9 SEC
AST SERPL-CCNC: 26 U/L
BASOPHILS # BLD AUTO: 0.05 K/UL
BASOPHILS NFR BLD AUTO: 1.3 %
BILIRUB SERPL-MCNC: 0.2 MG/DL
BUN SERPL-MCNC: 21 MG/DL
CALCIUM SERPL-MCNC: 9.7 MG/DL
CHLORIDE SERPL-SCNC: 109 MMOL/L
CO2 SERPL-SCNC: 23 MMOL/L
CREAT SERPL-MCNC: 0.98 MG/DL
CRP SERPL-MCNC: <3 MG/L
EGFR: 64 ML/MIN/1.73M2
EOSINOPHIL # BLD AUTO: 0.12 K/UL
EOSINOPHIL NFR BLD AUTO: 3.1 %
ERYTHROCYTE [SEDIMENTATION RATE] IN BLOOD BY WESTERGREN METHOD: 16 MM/HR
GLUCOSE SERPL-MCNC: 107 MG/DL
HCT VFR BLD CALC: 36.6 %
HCYS SERPL-MCNC: 11.6 UMOL/L
HGB BLD-MCNC: 11.5 G/DL
IMM GRANULOCYTES NFR BLD AUTO: 0.3 %
INR PPP: 0.95 RATIO
LYMPHOCYTES # BLD AUTO: 1.01 K/UL
LYMPHOCYTES NFR BLD AUTO: 25.9 %
MAN DIFF?: NORMAL
MCHC RBC-ENTMCNC: 29.5 PG
MCHC RBC-ENTMCNC: 31.4 GM/DL
MCV RBC AUTO: 93.8 FL
MONOCYTES # BLD AUTO: 0.31 K/UL
MONOCYTES NFR BLD AUTO: 7.9 %
NEUTROPHILS # BLD AUTO: 2.4 K/UL
NEUTROPHILS NFR BLD AUTO: 61.5 %
PLATELET # BLD AUTO: 269 K/UL
POTASSIUM SERPL-SCNC: 5.1 MMOL/L
PROT SERPL-MCNC: 6.7 G/DL
PT BLD: 10.8 SEC
RBC # BLD: 3.9 M/UL
RBC # FLD: 13.4 %
SODIUM SERPL-SCNC: 145 MMOL/L
VIT B12 SERPL-MCNC: 1236 PG/ML
WBC # FLD AUTO: 3.9 K/UL

## 2023-11-03 PROCEDURE — 99204 OFFICE O/P NEW MOD 45 MIN: CPT

## 2023-11-04 LAB — T PALLIDUM AB SER QL IA: NEGATIVE

## 2023-11-05 LAB — CARDIOLIPIN AB SER IA-ACNC: NEGATIVE

## 2023-11-06 LAB
ACE BLD-CCNC: 70 U/L
ANACR T: NEGATIVE
IGA 24H UR QL IFE: NORMAL

## 2023-11-07 LAB
ALBUMIN MFR SERPL ELPH: 61.1 %
ALBUMIN SERPL-MCNC: 4.1 G/DL
ALBUMIN/GLOB SERPL: 1.6 RATIO
ALPHA1 GLOB MFR SERPL ELPH: 4.8 %
ALPHA1 GLOB SERPL ELPH-MCNC: 0.3 G/DL
ALPHA2 GLOB MFR SERPL ELPH: 11.2 %
ALPHA2 GLOB SERPL ELPH-MCNC: 0.8 G/DL
B-GLOBULIN MFR SERPL ELPH: 11.8 %
B-GLOBULIN SERPL ELPH-MCNC: 0.8 G/DL
B2 GLYCOPROT1 IGA SERPL IA-ACNC: <5 SAU
B2 GLYCOPROT1 IGG SER-ACNC: <5 SGU
B2 GLYCOPROT1 IGM SER-ACNC: 16.8 SMU
CARDIOLIPIN IGM SER-MCNC: 20.5 MPL
CARDIOLIPIN IGM SER-MCNC: <5 GPL
DEPRECATED KAPPA LC FREE/LAMBDA SER: 1.96 RATIO
GAMMA GLOB FLD ELPH-MCNC: 0.7 G/DL
GAMMA GLOB MFR SERPL ELPH: 11.1 %
IGA SER QL IEP: 121 MG/DL
IGG SER QL IEP: 731 MG/DL
IGM SER QL IEP: 88 MG/DL
INTERPRETATION SERPL IEP-IMP: NORMAL
KAPPA LC CSF-MCNC: 1.36 MG/DL
KAPPA LC SERPL-MCNC: 2.67 MG/DL
M PROTEIN SPEC IFE-MCNC: NORMAL
PROT SERPL-MCNC: 6.7 G/DL
PROT SERPL-MCNC: 6.7 G/DL

## 2023-11-08 LAB
METHYLMALONATE SERPL-SCNC: 337 NMOL/L
VIT B1 SERPL-MCNC: 98.2 NMOL/L

## 2023-11-13 LAB
AMPA-R ABCBA: NEGATIVE
AMPHIPHYSIN IGG TITR SER IF: NEGATIVE
ANNOTATION COMMENT IMP: NORMAL
AQP4 H2O CHANNEL AB SERPL IA-ACNC: NEGATIVE
CASPR2-IGG CBA, S: NEGATIVE
CV2 IGG TITR SER: NEGATIVE
GABA-B ABCBA: NEGATIVE
GAD65 AB SER-MCNC: 0 NMOL/L
GLIAL NUC TYPE 1 AB TITR SER: NEGATIVE
HU1 AB TITR SER: NEGATIVE
HU2 AB TITR SER IF: NEGATIVE
HU3 AB TITR SER: NEGATIVE
IGLON5 IFA, S: NEGATIVE
IMMUNOLOGIST REVIEW: NORMAL
LGI1-IGG CBA, S: NEGATIVE
MOG AB SER QL CBA IFA: NEGATIVE
NIF IFA, S: NEGATIVE
NMDA-R ABCBA: NEGATIVE
PCA-1 AB TITR SER: NEGATIVE
PCA-2 AB TITR SER: NEGATIVE
PCA-TR AB TITR SER: NEGATIVE

## 2023-11-19 ENCOUNTER — APPOINTMENT (OUTPATIENT)
Dept: MRI IMAGING | Facility: CLINIC | Age: 63
End: 2023-11-19

## 2023-11-21 ENCOUNTER — TRANSCRIPTION ENCOUNTER (OUTPATIENT)
Age: 63
End: 2023-11-21

## 2023-11-22 ENCOUNTER — TRANSCRIPTION ENCOUNTER (OUTPATIENT)
Age: 63
End: 2023-11-22

## 2023-11-22 DIAGNOSIS — R90.82 WHITE MATTER DISEASE, UNSPECIFIED: ICD-10-CM

## 2023-11-24 ENCOUNTER — TRANSCRIPTION ENCOUNTER (OUTPATIENT)
Age: 63
End: 2023-11-24

## 2023-11-29 ENCOUNTER — NON-APPOINTMENT (OUTPATIENT)
Age: 63
End: 2023-11-29

## 2023-11-29 ENCOUNTER — APPOINTMENT (OUTPATIENT)
Dept: OPHTHALMOLOGY | Facility: CLINIC | Age: 63
End: 2023-11-29
Payer: MEDICARE

## 2023-11-29 PROCEDURE — 92083 EXTENDED VISUAL FIELD XM: CPT

## 2023-11-29 PROCEDURE — 99204 OFFICE O/P NEW MOD 45 MIN: CPT

## 2023-12-01 ENCOUNTER — TRANSCRIPTION ENCOUNTER (OUTPATIENT)
Age: 63
End: 2023-12-01

## 2023-12-01 LAB — Lab: NORMAL

## 2023-12-05 ENCOUNTER — TRANSCRIPTION ENCOUNTER (OUTPATIENT)
Age: 63
End: 2023-12-05

## 2023-12-05 ENCOUNTER — NON-APPOINTMENT (OUTPATIENT)
Age: 63
End: 2023-12-05

## 2023-12-11 ENCOUNTER — TRANSCRIPTION ENCOUNTER (OUTPATIENT)
Age: 63
End: 2023-12-11

## 2023-12-28 ENCOUNTER — LABORATORY RESULT (OUTPATIENT)
Age: 63
End: 2023-12-28

## 2023-12-29 ENCOUNTER — NON-APPOINTMENT (OUTPATIENT)
Age: 63
End: 2023-12-29

## 2023-12-29 LAB
ALBUMIN SERPL ELPH-MCNC: 4.6 G/DL
ALP BLD-CCNC: 84 U/L
ALT SERPL-CCNC: 18 U/L
ANION GAP SERPL CALC-SCNC: 10 MMOL/L
APTT BLD: 35.5 SEC
AST SERPL-CCNC: 24 U/L
BASOPHILS # BLD AUTO: 0.04 K/UL
BASOPHILS NFR BLD AUTO: 1 %
BILIRUB SERPL-MCNC: 0.2 MG/DL
BUN SERPL-MCNC: 20 MG/DL
CALCIUM SERPL-MCNC: 9.4 MG/DL
CARDIOLIPIN AB SER IA-ACNC: NEGATIVE
CHLORIDE SERPL-SCNC: 106 MMOL/L
CO2 SERPL-SCNC: 24 MMOL/L
CREAT SERPL-MCNC: 1 MG/DL
CRP SERPL-MCNC: <3 MG/L
EGFR: 63 ML/MIN/1.73M2
EOSINOPHIL # BLD AUTO: 0.1 K/UL
EOSINOPHIL NFR BLD AUTO: 2.4 %
ERYTHROCYTE [SEDIMENTATION RATE] IN BLOOD BY WESTERGREN METHOD: 16 MM/HR
GLUCOSE SERPL-MCNC: 100 MG/DL
HCT VFR BLD CALC: 36 %
HGB BLD-MCNC: 11.2 G/DL
IMM GRANULOCYTES NFR BLD AUTO: 0.2 %
LYMPHOCYTES # BLD AUTO: 1.27 K/UL
LYMPHOCYTES NFR BLD AUTO: 30.5 %
MAN DIFF?: NORMAL
MCHC RBC-ENTMCNC: 29.9 PG
MCHC RBC-ENTMCNC: 31.1 GM/DL
MCV RBC AUTO: 96 FL
MONOCYTES # BLD AUTO: 0.31 K/UL
MONOCYTES NFR BLD AUTO: 7.4 %
NEUTROPHILS # BLD AUTO: 2.44 K/UL
NEUTROPHILS NFR BLD AUTO: 58.5 %
PLATELET # BLD AUTO: 256 K/UL
POTASSIUM SERPL-SCNC: 5 MMOL/L
PROT SERPL-MCNC: 6.5 G/DL
RBC # BLD: 3.75 M/UL
RBC # FLD: 13.3 %
SODIUM SERPL-SCNC: 141 MMOL/L
WBC # FLD AUTO: 4.17 K/UL

## 2023-12-30 LAB
B2 GLYCOPROT1 IGA SERPL IA-ACNC: <5 SAU
B2 GLYCOPROT1 IGG SER-ACNC: <5 SGU
B2 GLYCOPROT1 IGM SER-ACNC: 16.4 SMU
CARDIOLIPIN IGM SER-MCNC: 16.9 MPL
CARDIOLIPIN IGM SER-MCNC: <5 GPL
DEPRECATED CARDIOLIPIN IGA SER: <5 APL

## 2024-01-01 LAB — CRYOGLOB SERPL-MCNC: NEGATIVE

## 2024-01-04 ENCOUNTER — TRANSCRIPTION ENCOUNTER (OUTPATIENT)
Age: 64
End: 2024-01-04

## 2024-01-04 LAB
ACYL C3: 0.1 UMOL/L
C10: 0.18 UMOL/L
C10:1: 0.22 UMOL/L
C10:2: 0.06 UMOL/L
C12: 0.09 UMOL/L
C14-OH: 0.01 UMOL/L
C14: 0.04 UMOL/L
C14:1: 0.1 UMOL/L
C14:2: 0.08 UMOL/L
C16-OH: 0.01 UMOL/L
C16: 0.08 UMOL/L
C16:1-OH: 0 UMOL/L
C16:1: 0.04 UMOL/L
C18-OH: 0 UMOL/L
C18: 0.03 UMOL/L
C18:1-OH: 0 UMOL/L
C18:1: 0.14 UMOL/L
C18:2-OH: 0.01 UMOL/L
C18:2: 0.08 UMOL/L
C22:0: 74.3 NMOL/ML
C24:0/C22:0: 0.96 RATIO
C24:0: 71.6 NMOL/ML
C26:0/C22:0: 0.01 RATIO
C26:0: 0.62 NMOL/ML
C2: 4.64 UMOL/L
C3-DC: 0.07 UMOL/L
C4-DC: 0.03 UMOL/L
C4-OH: 0.03 UMOL/L
C4: 0.07 UMOL/L
C5-OH: 0.01 UMOL/L
C5: 0.04 UMOL/L
C5:1: 0 UMOL/L
C6: 0.04 UMOL/L
C8: 0.14 UMOL/L
COMMENT: NORMAL
DIRECTOR REVIEW: NORMAL
GLUTARYLCARN SERPL-SCNC: 0.05 UMOL/L
INTERPRETATION: NORMAL
Lab: NORMAL
Lab: NORMAL
PHYTANIC ACID: 1.75 NMOL/ML
PRISTANIC ACID: 0.04 NMOL/ML
PRISTANIC/ PHYTANIC: 0.02 RATIO

## 2024-01-05 ENCOUNTER — TRANSCRIPTION ENCOUNTER (OUTPATIENT)
Age: 64
End: 2024-01-05

## 2024-02-07 ENCOUNTER — APPOINTMENT (OUTPATIENT)
Dept: GASTROENTEROLOGY | Facility: CLINIC | Age: 64
End: 2024-02-07
Payer: MEDICARE

## 2024-02-07 VITALS
HEART RATE: 52 BPM | SYSTOLIC BLOOD PRESSURE: 135 MMHG | HEIGHT: 62 IN | OXYGEN SATURATION: 99 % | BODY MASS INDEX: 25.58 KG/M2 | DIASTOLIC BLOOD PRESSURE: 79 MMHG | WEIGHT: 139 LBS

## 2024-02-07 DIAGNOSIS — D64.9 ANEMIA, UNSPECIFIED: ICD-10-CM

## 2024-02-07 DIAGNOSIS — K21.9 GASTRO-ESOPHAGEAL REFLUX DISEASE W/OUT ESOPHAGITIS: ICD-10-CM

## 2024-02-07 DIAGNOSIS — R10.9 UNSPECIFIED ABDOMINAL PAIN: ICD-10-CM

## 2024-02-07 PROCEDURE — 99204 OFFICE O/P NEW MOD 45 MIN: CPT

## 2024-02-07 RX ORDER — SODIUM PICOSULFATE, MAGNESIUM OXIDE, AND ANHYDROUS CITRIC ACID 12; 3.5; 1 G/175ML; G/175ML; MG/175ML
10-3.5-12 MG-GM LIQUID ORAL
Qty: 1 | Refills: 0 | Status: ACTIVE | COMMUNITY
Start: 2024-02-07 | End: 1900-01-01

## 2024-02-07 NOTE — PHYSICAL EXAM
[Alert] : alert [Normal Voice/Communication] : normal voice/communication [Healthy Appearing] : healthy appearing [No Acute Distress] : no acute distress [Sclera] : the sclera and conjunctiva were normal [Normal Appearance] : the appearance of the neck was normal [No Respiratory Distress] : no respiratory distress [No Acc Muscle Use] : no accessory muscle use [Respiration, Rhythm And Depth] : normal respiratory rhythm and effort [Auscultation Breath Sounds / Voice Sounds] : lungs were clear to auscultation bilaterally [Heart Rate And Rhythm] : heart rate was normal and rhythm regular [Normal S1, S2] : normal S1 and S2 [Murmurs] : no murmurs [Bowel Sounds] : normal bowel sounds [Abdomen Tenderness] : non-tender [No Masses] : no abdominal mass palpated [Abdomen Soft] : soft [] : no hepatosplenomegaly [Cervical Lymph Nodes Enlarged Posterior Bilaterally] : no posterior cervical lymphadenopathy [Supraclavicular Lymph Nodes Enlarged Bilaterally] : no supraclavicular lymphadenopathy [Cervical Lymph Nodes Enlarged Anterior Bilaterally] : no anterior cervical lymphadenopathy [No CVA Tenderness] : no CVA  tenderness [Abnormal Walk] : normal gait [No Clubbing, Cyanosis] : no clubbing or cyanosis of the fingernails [Normal Color / Pigmentation] : normal skin color and pigmentation [Oriented To Time, Place, And Person] : oriented to person, place, and time [Normal Affect] : the affect was normal [Normal Insight/Judgment] : insight and judgment were intact [Normal Mood] : the mood was normal

## 2024-02-07 NOTE — ASSESSMENT
[FreeTextEntry1] : Impression:  #1 colon cancer screening-rule out colonic neoplasm. Family history of colon cancer affecting first degree relative (mother age 62) and a second degree relative (maternal grandfather age 50) is noted and poses an increased risk of colorectal neoplasia. No current lower GI symptoms reported.  #2 GERD- occasional symptoms of heartburn and regurgitation.  Significantly improved following discontinuation of alendronate.  Now only experiencing approximately 1 episode per month.  Alarm symptoms not reported.  #3 abdominal pain- intermittent symptoms of left paraepigastric and left upper quadrant achy and gnawing discomfort in association with left anterior chest pain.  Longstanding symptoms of approximately 14 years duration.  As per patient question posed as to cardiac related in view of her report of small vessel disease from her [facility] consultation.  #4 anemia-borderline hemoglobin of 11.1 g.  Evaluate for iron deficiency.  General medical problems:  # CAD-status post LAD stent 4/2011. Reports associated history of Prinzmetal angina/coronary artery spasm.  # hypertension.  # osteoporosis.  # history of hepatic hemangioma.  # kidney stone.  # right breast cancer 2012-status post lumpectomy and radiation therapy.  # left parotidectomy 1976-benign mixed tumor.

## 2024-02-07 NOTE — ADDENDUM
[FreeTextEntry1] : Plan:  #1 the patient will be scheduled for a screening colonoscopy.  I had an extensive discussion with the patient including risks, benefits and alternatives possibly including bleeding, perforation, inadvertent injury to contiguous organs including spleen, acute colitis, need for blood transfusion or surgery, infection, and medication and anesthetic risk. The limitations of colonoscopy were discussed including missed polyps and cancer.  Possible medication and anesthesia related adverse cardiovascular and respiratory reactions were reviewed.  The patient wishes to proceed and will be scheduled for screening colonoscopy.  The rationale of colonoscopy was discussed not only in terms of the early detection of colon cancer, but also as a means of prevention in the event of removal of a polyp.  The limitations of colonoscopy were discussed, and the patient is aware that not every cancer is prevented.  The patient will utilize the Clenpiq colonoscopy preparation in split fashion and the administration of this product was discussed.  #2 antireflux precautions.  #3 option of upper endoscopy for further evaluation regarding occasional GERD symptoms as well as longstanding chronic epigastric and left upper quadrant pain discussed.  However, at current time patient does not wish to pursue upper endoscopy.  As noted, indicates that frequency of heartburn and reflux symptoms are significantly better.  #4 detection of anemia discussed.  Possible significance reviewed.  CBC, iron/TIBC, ferritin, folate, B12 and TTG IgA will be submitted.  #5 abdominal ultrasound will be submitted for further evaluation.  Pending results may warrant more detailed imaging with CT scan abdomen.

## 2024-02-07 NOTE — HISTORY OF PRESENT ILLNESS
[FreeTextEntry1] : Office consultation on 2024.  Patient is a 63-year-old woman evaluated for consultation in preparation for surveillance colonoscopy.  Previously evaluated for office consultation on 2020 in preparation for colonoscopy but never proceeded in view of onset of COVID pandemic.  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 Patient also reports dyspepsia and GERD symptoms. History per patient. Patient has no PMD.  was in attendance who is an anesthesiologist.  Significant cardiac history is reported with diagnosis of Prinzmetal angina (coronary artery spasm) and also occlusive CAD. In 2011 the patient had an LAD stent for high-grade LAD stenosis. Does not report having had an MI. No history of arrhythmias or other cardiac history. Patient reports last evaluation by cardiology was in 2018.  Patient is on long standing maintenance aspirin 81 mg and clopidogrel 75 mg daily.  Family history of colon cancer is reported with mother diagnosed with a mucinous adenocarcinoma of the sigmoid colon at age 62. Mother also had a papillary renal cell carcinoma. In addition, maternal grandfather also had colon cancer at approximately age 50.  The patient reports undergoing 3 prior colonoscopy examinations in , 2010 and 2015. Patient reports that all exams were normal except for diminutive 3 mm hyperplastic polyp at the 2015 colonoscopy.  Patient has long-standing symptoms of indigestion, reflux and dyspepsia for the past 10 years since cardiac diagnosis. However, increased frequency of and severity of symptoms were reported throughout 2019.  GI symptoms described as heartburn manifesting as feelings of regurgitation and burning sensation in her throat. As noted, this has been intermittent for the past several years but increased in frequency over the past 2 months and patient was having several episodes a day during 2019. Associated symptoms of urge to belch as well as feeling of throat irritation with need to clear her throat is also reported.  In addition, the patient experiences intermittent epigastric and left upper quadrant discomfort. Described as a achy and gnawing feeling. Can be provoked by acid foods. Describes this as a gnawing and grinding discomfort. The symptoms could also be associated with an achy left anterior chest discomfort. This is not exertional. This has been intermittent and long-standing.  Her appetite is fair. Denies weight change. Denies any complaints of dysphagia. Swallows liquids without any choking, coughing or nasal regurgitation. Swallow solid food without any retrosternal bolus hangup.  No complaints of lower abdominal pain.  The patient typically has one formed bowel movement daily. Can have occasional diarrhea but appears infrequent. Mostly daily normal bowel movements. No reported constipation, change in bowel habit or rectal bleeding.  Reports history of hepatic hemangioma. Reports no history of digestive illness.  Patient has a history of osteoporosis. Currently on alendronate 70 mg weekly. Not clear as to what degree there is any relation between this medication and her upper GI symptoms. Patient indicates she is cautious as to medication administration and always takes meds with adequate fluid in upright position.  CURRENT HISTORY:  CONSULTATION 2024:      -Presents for consultation in preparation for a surveillance colonoscopy.  Patient typically has 1 formed [place] 3 or [place] 4 bowel movement daily without any complaints of diarrhea, constipation, change or change in bowel habit.  Denies rectal bleeding except rarely can notice scant "spotting" on toilet paper only.                                                   -Appetite stable.  Indicates gradual 20-pound weight loss over past several months.  Somewhat trying to lose weight but also had decreased appetite.  Indicates recently regained 6 pounds.  Denies complaints of dysphagia.  Occasionally will gag on saliva.  However, swallows' liquids without choking, coughing or nasal regurgitation.  Swallows' solid food without retrosternal hanging up.  Can experience intermittent symptoms of reflux that she describes as occasional heartburn or regurgitation.  Was more troublesome 2023 and apparently stopping alendronate has been associated with significant decreased frequency of symptoms.  Over the past several months might have heartburn approximately once per month.  Typically feels sense of retrosternal burning with regurgitation and may have associated cough and throat clearing.                                                    -Ever since diagnosis of CAD with coronary stent to LAD in  the patient has been experiencing an intermittent pain variably located at the left anterior chest, left upper quadrant of abdomen or left para epigastric region.  This is an intermittent pain that is described as an ache, and which is nonradiating.  Precipitating factors can include exercise, stress or exposure to cold.  Can occur at other times as well.  Variable duration noted anywhere between seconds to hours.  Severity also varied but mostly mild.  Somewhat better with rest.  In the past this had been a daily occurrence.  Started metoprolol 2020 with some improvement now still has pain but less frequent with few episodes per week.  Can be associated with fatigue and queasiness.  No associated vomiting or diarrhea.  Of note, the patient is followed closely by cardiology.  Had a nuclear stress test 10/2023 noted for normal perfusion.  She indicates that she consulted at the [facility] 2023 regarding these complaints and was advised that small vessel disease of cardiac etiology could be the cause.  Symptoms were not deemed to be GI in origin from what patient was informed of.  Dose of amlodipine was optimized and patient was started on L-arginine.                                                     -Recently had CBC noted for borderline anemia with hemoglobin 11.1 g.                                                       -PMH:                                                     -- Past diseases-hypertension, CAD status post LAD stent , Prinzmetal's angina, hypercholesterolemia, breast cancer status post right lumpectomy, thyroiditis, basal cell cancer excision, kidney stone, colonic polyp, CNS microvascular ischemia.                                                    -- Operations-left parathyroidectomy, right lumpectomy, patella fracture repair, basal cell cancer excision.                                                    -- Family history: Father  age 56; heart disease.  Mother  age 79; sepsis, renal cell cancer, colon cancer diagnosed early 60s.  Paternal grandfather had colon cancer age 52.  Maternal cousin had pancreas cancer.                                                    -- Social history: Tobacco-none.  Alcohol-none.  Caffeine intake reported.  .  Physician.                                                    -- Medications: Isosorbide 60 mg, amlodipine 5 mg alternating with 7.5 mg, metoprolol 25 mg, rosuvastatin 10 mg, anastrozole 1 mg, aspirin 81 mg, co-Q10 300 mg, omega-3, L-arginine 2000 mg, Citracal 2 tabs, vitamin D 5000 units, women's multivitamin, AREDS.

## 2024-02-12 ENCOUNTER — TRANSCRIPTION ENCOUNTER (OUTPATIENT)
Age: 64
End: 2024-02-12

## 2024-02-12 LAB
MISCELLANEOUS TEST: NORMAL
PROC NAME: NORMAL

## 2024-02-12 NOTE — HISTORY OF PRESENT ILLNESS
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  She was accompanied by her  Kehinde, an anesthesiologist.  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  She was diagnosed with Prinzmetal angina.  She underwent coronary stenting in 2011 after presenting with chest pain.  She has no history of congestive heart failure or arrhythmia.  She was recently diagnosed with neurogenic dermatitis which was manifested by pruritus on her arms and legs.  This resolved with topical steroids.  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  Over the past year, she has experienced 20 episodes of right eye visual obscuration.  She describes new onset of a lacy vail in the shape of an amoeba which begins in her medial visual field and gradually moves over laterally.  These episodes last up to 10 minutes and are not followed by headache.  Her vision however is obscured during the episode.  Her left eye is never involved.  There is no prior history of migraine.  She underwent a recent MRI of the brain without contrast which revealed extensive white matter increased FLAIR signal.  There was no evidence of infarction.  Contrast was not administered.  There are no food or environmental triggers except for sometimes bending over.  In July, she experienced transient mid upper lip numbness which resolved.  Past surgical history is notable for a left parotidectomy for a benign adenoma in 1976, right lumpectomy in 2012 followed by radiation and Arimidex and a left patellar repair.  She has a right lateral canthus basal cell carcinoma for which she anticipates Mohs surgery.  She suffers from hypertension, coronary artery disease, angina, mild GERD and neurogenic dermatitis.  There is a history of thyroiditis.  She denies hyperlipidemia, diabetes, pulmonary, renal, hepatic, hematologic or cerebrovascular disease.  She has no allergies.  Medications include isosorbide, clopidogrel, aspirin, metoprolol ER 25 mg/day, amlodipine 5 mg daily, rosuvastatin 10 mg/day, anastrozole, calcium, vitamin D, omega-3, coenzyme Q10 and multivitamin.  She is a non-smoker and nondrinker.  She is  and is a pediatrician.  Family history is notable for colon cancer, cardiac disease and kidney cancer.

## 2024-02-12 NOTE — ASSESSMENT
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  She presents with multiple stereotypical episodes of right eye visual obscuration over the past year.  Her examination is unremarkable.  MRI of the brain revealed extensive white matter disease but without evidence of infarction or involvement of the corpus callosum.  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  I cannot exclude an underlying optic neuropathy with Uthoff phenomena.  I suggested that she undergo MRIs of the brain and orbits with and without contrast and noncontrast MRIs of the cervical and thoracic spine.  A comprehensive serologic evaluation will be performed.  She will be referred for neuro-ophthalmology consultation.  Further management will depend upon these results and her clinical course.

## 2024-02-12 NOTE — PHYSICAL EXAM
[FreeTextEntry1] : Constitutional:  Patient was well-developed, well-nourished and in no acute distress.   Head:  Normocephalic, atraumatic. Tympanic membranes were clear.   Neck:  Supple with full range of motion.   Cardiovascular:  Cardiac rhythm was regular without murmur. There were no carotid bruits. Peripheral pulses were full and symmetric.   Respiratory:  Lungs were clear.   Abdomen:  Soft and nontender.   Spine:  Nontender.   Skin:  There were no rashes.   NEUROLOGICAL EXAMINATION:  Mental Status: Patient was alert and oriented. Speech was fluent. There was no dysarthria.   Cranial Nerves:   II: Visual acuity was 20/20-1 in the right eye and 20/20 in the left eye with glasses and near card. Pupils were equal and reactive. Visual fields were full. Funduscopic examination was normal. There was no afferent pupillary defect.  III, IV, VI:  Eye movements were full without nystagmus.   V: Facial sensation was intact.   VII: Facial strength was normal.   VIII: Hearing was equal.   IX, X: Palatal movement was normal. Phonation was normal.   XI: Sternocleidomastoids and trapezii were normal.   XII: Tongue was midline and movements normal. There was no lingual atrophy or fasciculations.   Motor Examination: Muscle bulk, tone and strength were normal.   Sensory Examination: Pinprick, vibration and joint position sense were intact.   Reflexes: DTRs were 2+ throughout.   Plantar Responses: Plantar responses were flexor.   Coordination/Cerebellar Function: There was no dysmetria on finger to nose or heel to shin testing.   Gait/Stance: Gait and tandem were normal. Romberg was negative.

## 2024-02-13 ENCOUNTER — TRANSCRIPTION ENCOUNTER (OUTPATIENT)
Age: 64
End: 2024-02-13

## 2024-02-15 ENCOUNTER — TRANSCRIPTION ENCOUNTER (OUTPATIENT)
Age: 64
End: 2024-02-15

## 2024-04-10 ENCOUNTER — OUTPATIENT (OUTPATIENT)
Dept: OUTPATIENT SERVICES | Facility: HOSPITAL | Age: 64
LOS: 1 days | End: 2024-04-10

## 2024-04-10 VITALS
TEMPERATURE: 97 F | OXYGEN SATURATION: 96 % | HEIGHT: 60 IN | SYSTOLIC BLOOD PRESSURE: 138 MMHG | WEIGHT: 138.89 LBS | HEART RATE: 55 BPM | RESPIRATION RATE: 14 BRPM | DIASTOLIC BLOOD PRESSURE: 78 MMHG

## 2024-04-10 DIAGNOSIS — Z12.11 ENCOUNTER FOR SCREENING FOR MALIGNANT NEOPLASM OF COLON: ICD-10-CM

## 2024-04-10 DIAGNOSIS — Z98.890 OTHER SPECIFIED POSTPROCEDURAL STATES: Chronic | ICD-10-CM

## 2024-04-10 DIAGNOSIS — Z87.81 PERSONAL HISTORY OF (HEALED) TRAUMATIC FRACTURE: Chronic | ICD-10-CM

## 2024-04-10 RX ORDER — SODIUM CHLORIDE 9 MG/ML
3 INJECTION INTRAMUSCULAR; INTRAVENOUS; SUBCUTANEOUS EVERY 8 HOURS
Refills: 0 | Status: DISCONTINUED | OUTPATIENT
Start: 2024-04-19 | End: 2024-05-03

## 2024-04-10 RX ORDER — SODIUM CHLORIDE 9 MG/ML
1000 INJECTION, SOLUTION INTRAVENOUS
Refills: 0 | Status: DISCONTINUED | OUTPATIENT
Start: 2024-04-19 | End: 2024-05-03

## 2024-04-10 NOTE — H&P PST ADULT - HISTORY OF PRESENT ILLNESS
62 yo female had colonoscopy last 2015 hx of colonoscopy 7 polypectomy  denies any symptoms present in PST for Preop evaluation for surveillance colonoscopy. 64 yo female  hxhx of colonoscopy with polypectomy  last  colonoscopy last 2015. Pt  present in PST for Preop evaluation for surveillance colonoscopy.

## 2024-04-10 NOTE — H&P PST ADULT - NSICDXPASTMEDICALHX_GEN_ALL_CORE_FT
PAST MEDICAL HISTORY:  Breast cancer, right     CAD (Coronary Artery Disease)     H/O thyroiditis     HTN (Hypertension)     Left patella fracture     Osteoporosis     Parotid tumor     Prinzmetal Variant Angina      PAST MEDICAL HISTORY:  Basal cell carcinoma of canthus, right     Breast cancer, right     CAD (Coronary Artery Disease)     H/O thyroiditis     HTN (Hypertension)     Left patella fracture     Osteoporosis     Parotid tumor     Prinzmetal Variant Angina

## 2024-04-10 NOTE — H&P PST ADULT - NSICDXPASTSURGICALHX_GEN_ALL_CORE_FT
PAST SURGICAL HISTORY:  Coronary Stent LAD x1    H/O fracture of patella     History of colonoscopy     S/P colonoscopy with polypectomy     S/P left knee surgery     S/P lumpectomy, right breast      PAST SURGICAL HISTORY:  Coronary Stent LAD x1    H/O fracture of patella     History of colonoscopy     S/P colonoscopy with polypectomy     S/P left knee surgery     S/P lumpectomy, right breast     S/P Mohs surgery for basal cell carcinoma

## 2024-04-10 NOTE — H&P PST ADULT - ALLERGIC/IMMUNOLOGIC
Where Is Your Acne Located?: Face Additional Comments (Use Complete Sentences): She would like to start isotretinoin as previously discussed. negative

## 2024-04-10 NOTE — H&P PST ADULT - PROBLEM SELECTOR PLAN 1
scheduled procedure colonoscopy  Pre op and Hibiclens instructions given and explained.  Avoid NSAIDs and OTC supplements.   Patient verbalized understanding   BMP, CBC, results in chart scheduled procedure colonoscopy  Pre op and Hibiclens instructions given and explained.  Avoid NSAIDs and OTC supplements.   Patient verbalized understanding   BMP, CBC, results in chart    CAD- will continue aspirin therapy

## 2024-04-10 NOTE — H&P PST ADULT - NS MD HP INPLANTS MED DEV
Pt instructed to continue aspirin, Hold Plavix 5 days before surgery, hold Eliquis 2 days prior to surgery LAD coronary artery stent LAD

## 2024-04-10 NOTE — H&P PST ADULT - CARDIOVASCULAR COMMENTS
pmhx Prinzmetal angina controlled with calcium channel last episode 10 year ago, PCI with Pt instructed to continue aspirin, Hold Plavix 5 days before surgery, hold Eliquis 2 days prior to surgery to LAD 2011

## 2024-04-10 NOTE — H&P PST ADULT - NEGATIVE ENMT SYMPTOMS
no hearing difficulty/no ear pain/no vertigo/no sinus symptoms/no nasal congestion/no nasal discharge/no nasal obstruction/no nose bleeds/no recurrent cold sores/no abnormal taste sensation/no gum bleeding/no dry mouth/no throat pain

## 2024-04-11 LAB
FERRITIN SERPL-MCNC: 28 NG/ML
FOLATE SERPL-MCNC: >20 NG/ML
HCT VFR BLD CALC: 37.7 %
HGB BLD-MCNC: 11.8 G/DL
IGA SER QL IEP: 107 MG/DL
IRON SATN MFR SERPL: 16 %
IRON SERPL-MCNC: 65 UG/DL
MCHC RBC-ENTMCNC: 29 PG
MCHC RBC-ENTMCNC: 31.3 GM/DL
MCV RBC AUTO: 92.6 FL
PLATELET # BLD AUTO: 273 K/UL
RBC # BLD: 4.07 M/UL
RBC # FLD: 13.6 %
TIBC SERPL-MCNC: 412 UG/DL
TTG IGA SER IA-ACNC: <1.2 U/ML
TTG IGA SER-ACNC: NEGATIVE
UIBC SERPL-MCNC: 347 UG/DL
VIT B12 SERPL-MCNC: 889 PG/ML
WBC # FLD AUTO: 5.97 K/UL

## 2024-04-12 ENCOUNTER — TRANSCRIPTION ENCOUNTER (OUTPATIENT)
Age: 64
End: 2024-04-12

## 2024-04-18 ENCOUNTER — TRANSCRIPTION ENCOUNTER (OUTPATIENT)
Age: 64
End: 2024-04-18

## 2024-04-18 NOTE — ASU PATIENT PROFILE, ADULT - NSICDXPASTMEDICALHX_GEN_ALL_CORE_FT
PAST MEDICAL HISTORY:  Basal cell carcinoma of canthus, right     Breast cancer, right     CAD (Coronary Artery Disease)     H/O thyroiditis     HTN (Hypertension)     Left patella fracture     Osteoporosis     Parotid tumor     Prinzmetal Variant Angina

## 2024-04-18 NOTE — ASU PATIENT PROFILE, ADULT - NSICDXPASTSURGICALHX_GEN_ALL_CORE_FT
PAST SURGICAL HISTORY:  Coronary Stent LAD x1    H/O fracture of patella     History of colonoscopy     S/P colonoscopy with polypectomy     S/P left knee surgery     S/P lumpectomy, right breast     S/P Mohs surgery for basal cell carcinoma

## 2024-04-19 ENCOUNTER — APPOINTMENT (OUTPATIENT)
Dept: GASTROENTEROLOGY | Facility: HOSPITAL | Age: 64
End: 2024-04-19

## 2024-04-19 ENCOUNTER — OUTPATIENT (OUTPATIENT)
Dept: OUTPATIENT SERVICES | Facility: HOSPITAL | Age: 64
LOS: 1 days | Discharge: HOME | End: 2024-04-19
Payer: MEDICARE

## 2024-04-19 ENCOUNTER — RESULT REVIEW (OUTPATIENT)
Age: 64
End: 2024-04-19

## 2024-04-19 VITALS
HEIGHT: 61 IN | WEIGHT: 136.91 LBS | TEMPERATURE: 98 F | RESPIRATION RATE: 14 BRPM | SYSTOLIC BLOOD PRESSURE: 129 MMHG | OXYGEN SATURATION: 100 % | DIASTOLIC BLOOD PRESSURE: 76 MMHG | HEART RATE: 55 BPM

## 2024-04-19 VITALS
OXYGEN SATURATION: 97 % | HEART RATE: 49 BPM | RESPIRATION RATE: 12 BRPM | SYSTOLIC BLOOD PRESSURE: 123 MMHG | DIASTOLIC BLOOD PRESSURE: 52 MMHG

## 2024-04-19 DIAGNOSIS — Z87.81 PERSONAL HISTORY OF (HEALED) TRAUMATIC FRACTURE: Chronic | ICD-10-CM

## 2024-04-19 DIAGNOSIS — Z12.11 ENCOUNTER FOR SCREENING FOR MALIGNANT NEOPLASM OF COLON: ICD-10-CM

## 2024-04-19 DIAGNOSIS — Z98.890 OTHER SPECIFIED POSTPROCEDURAL STATES: Chronic | ICD-10-CM

## 2024-04-19 PROCEDURE — 45380 COLONOSCOPY AND BIOPSY: CPT

## 2024-04-19 PROCEDURE — 88305 TISSUE EXAM BY PATHOLOGIST: CPT | Mod: 26

## 2024-04-19 RX ORDER — METOPROLOL TARTRATE 50 MG
1 TABLET ORAL
Refills: 0 | DISCHARGE

## 2024-04-19 RX ORDER — ASPIRIN/CALCIUM CARB/MAGNESIUM 324 MG
1 TABLET ORAL
Refills: 0 | DISCHARGE

## 2024-04-19 RX ORDER — SODIUM CHLORIDE 9 MG/ML
1000 INJECTION, SOLUTION INTRAVENOUS
Refills: 0 | Status: DISCONTINUED | OUTPATIENT
Start: 2024-04-19 | End: 2024-05-03

## 2024-04-19 RX ORDER — ISOSORBIDE MONONITRATE 60 MG/1
1 TABLET, EXTENDED RELEASE ORAL
Refills: 0 | DISCHARGE

## 2024-04-19 RX ORDER — ROSUVASTATIN CALCIUM 5 MG/1
1 TABLET ORAL
Refills: 0 | DISCHARGE

## 2024-04-22 ENCOUNTER — APPOINTMENT (OUTPATIENT)
Dept: ULTRASOUND IMAGING | Facility: CLINIC | Age: 64
End: 2024-04-22
Payer: MEDICARE

## 2024-04-22 ENCOUNTER — APPOINTMENT (OUTPATIENT)
Dept: MAMMOGRAPHY | Facility: CLINIC | Age: 64
End: 2024-04-22
Payer: MEDICARE

## 2024-04-22 PROCEDURE — 76700 US EXAM ABDOM COMPLETE: CPT

## 2024-04-22 PROCEDURE — 76641 ULTRASOUND BREAST COMPLETE: CPT | Mod: 50,GY

## 2024-04-22 PROCEDURE — 77067 SCR MAMMO BI INCL CAD: CPT

## 2024-04-22 PROCEDURE — 77063 BREAST TOMOSYNTHESIS BI: CPT

## 2024-04-25 LAB — SURGICAL PATHOLOGY STUDY: SIGNIFICANT CHANGE UP

## 2024-04-27 ENCOUNTER — NON-APPOINTMENT (OUTPATIENT)
Age: 64
End: 2024-04-27

## 2024-04-27 DIAGNOSIS — Z12.11 ENCOUNTER FOR SCREENING FOR MALIGNANT NEOPLASM OF COLON: ICD-10-CM

## 2024-07-03 ENCOUNTER — APPOINTMENT (OUTPATIENT)
Dept: NEUROLOGY | Facility: CLINIC | Age: 64
End: 2024-07-03
Payer: MEDICARE

## 2024-07-03 VITALS
HEIGHT: 62 IN | BODY MASS INDEX: 25.58 KG/M2 | WEIGHT: 139 LBS | SYSTOLIC BLOOD PRESSURE: 151 MMHG | DIASTOLIC BLOOD PRESSURE: 72 MMHG | HEART RATE: 52 BPM

## 2024-07-03 DIAGNOSIS — R90.82 WHITE MATTER DISEASE, UNSPECIFIED: ICD-10-CM

## 2024-07-03 DIAGNOSIS — H93.A2 PULSATILE TINNITUS, LEFT EAR: ICD-10-CM

## 2024-07-03 DIAGNOSIS — D80.8 OTHER IMMUNODEFICIENCIES WITH PREDOMINANTLY ANTIBODY DEFECTS: ICD-10-CM

## 2024-07-03 DIAGNOSIS — H53.9 UNSPECIFIED VISUAL DISTURBANCE: ICD-10-CM

## 2024-07-03 PROCEDURE — 99213 OFFICE O/P EST LOW 20 MIN: CPT

## 2024-09-30 ENCOUNTER — LABORATORY RESULT (OUTPATIENT)
Age: 64
End: 2024-09-30

## 2024-10-01 ENCOUNTER — NON-APPOINTMENT (OUTPATIENT)
Age: 64
End: 2024-10-01

## 2025-03-05 ENCOUNTER — TRANSCRIPTION ENCOUNTER (OUTPATIENT)
Age: 65
End: 2025-03-05

## 2025-05-05 ENCOUNTER — TRANSCRIPTION ENCOUNTER (OUTPATIENT)
Age: 65
End: 2025-05-05

## 2025-05-07 ENCOUNTER — APPOINTMENT (OUTPATIENT)
Dept: MAMMOGRAPHY | Facility: CLINIC | Age: 65
End: 2025-05-07
Payer: MEDICARE

## 2025-05-07 ENCOUNTER — APPOINTMENT (OUTPATIENT)
Dept: ULTRASOUND IMAGING | Facility: CLINIC | Age: 65
End: 2025-05-07
Payer: MEDICARE

## 2025-05-07 PROCEDURE — 77067 SCR MAMMO BI INCL CAD: CPT

## 2025-05-07 PROCEDURE — 77063 BREAST TOMOSYNTHESIS BI: CPT

## 2025-05-07 PROCEDURE — 76641 ULTRASOUND BREAST COMPLETE: CPT | Mod: 50,GA

## 2025-05-28 ENCOUNTER — APPOINTMENT (OUTPATIENT)
Dept: NEUROLOGY | Facility: CLINIC | Age: 65
End: 2025-05-28
Payer: MEDICARE

## 2025-05-28 ENCOUNTER — NON-APPOINTMENT (OUTPATIENT)
Age: 65
End: 2025-05-28

## 2025-05-28 VITALS
BODY MASS INDEX: 25.4 KG/M2 | SYSTOLIC BLOOD PRESSURE: 148 MMHG | HEIGHT: 62 IN | HEART RATE: 56 BPM | DIASTOLIC BLOOD PRESSURE: 76 MMHG | WEIGHT: 138 LBS

## 2025-05-28 DIAGNOSIS — G43.109 MIGRAINE WITH AURA, NOT INTRACTABLE, W/OUT STATUS MIGRAINOSUS: ICD-10-CM

## 2025-05-28 DIAGNOSIS — R76.0 RAISED ANTIBODY TITER: ICD-10-CM

## 2025-05-28 PROCEDURE — 99213 OFFICE O/P EST LOW 20 MIN: CPT

## 2025-05-28 RX ORDER — UBROGEPANT 100 MG/1
100 TABLET ORAL
Qty: 8 | Refills: 3 | Status: ACTIVE | COMMUNITY
Start: 2025-05-28 | End: 1900-01-01

## (undated) DEVICE — BASIN EMESIS 10IN GRADUATED MAUVE

## (undated) DEVICE — BIOPSY FORCEP COLD DISP

## (undated) DEVICE — SALIVA EJECTOR (BLUE)

## (undated) DEVICE — TUBING SUCTION NONCONDUCTIVE 6MM X 12FT

## (undated) DEVICE — DRSG 2X2

## (undated) DEVICE — ELCTR ECG CONDUCTIVE ADHESIVE

## (undated) DEVICE — TUBING IV SET GRAVITY 3Y 100" MACRO

## (undated) DEVICE — TUBING MEDI-VAC W MAXIGRIP CONNECTORS 1/4"X6'

## (undated) DEVICE — GOWN LG

## (undated) DEVICE — DRSG CURITY GAUZE SPONGE 4 X 4" 12-PLY NON-STERILE

## (undated) DEVICE — LUBRICATING JELLY HR ONE SHOT 3G

## (undated) DEVICE — PACK IV START WITH CHG

## (undated) DEVICE — CATH IV SAFE BC 22G X 1" (BLUE)

## (undated) DEVICE — CONTAINER FORMALIN 80ML YELLOW

## (undated) DEVICE — BIOPSY FORCEP RADIAL JAW 4 STANDARD WITH NEEDLE

## (undated) DEVICE — DRSG BANDAID 0.75X3"

## (undated) DEVICE — ELCTR GROUNDING PAD ADULT COVIDIEN